# Patient Record
Sex: MALE | URBAN - METROPOLITAN AREA
[De-identification: names, ages, dates, MRNs, and addresses within clinical notes are randomized per-mention and may not be internally consistent; named-entity substitution may affect disease eponyms.]

---

## 2021-09-03 ENCOUNTER — OFFICE VISIT (OUTPATIENT)
Dept: URBAN - METROPOLITAN AREA CLINIC 30 | Facility: CLINIC | Age: 76
End: 2021-09-03
Payer: COMMERCIAL

## 2021-09-03 DIAGNOSIS — H43.812 VITREOUS DEGENERATION, LEFT EYE: ICD-10-CM

## 2021-09-03 DIAGNOSIS — H11.823 CONJUNCTIVOCHALASIS, BILATERAL: Primary | ICD-10-CM

## 2021-09-03 DIAGNOSIS — H43.393 OTHER VITREOUS OPACITIES, BILATERAL: ICD-10-CM

## 2021-09-03 DIAGNOSIS — H25.13 AGE-RELATED NUCLEAR CATARACT, BILATERAL: ICD-10-CM

## 2021-09-03 PROCEDURE — 92133 CPTRZD OPH DX IMG PST SGM ON: CPT | Performed by: OPHTHALMOLOGY

## 2021-09-03 PROCEDURE — 92134 CPTRZ OPH DX IMG PST SGM RTA: CPT | Performed by: OPHTHALMOLOGY

## 2021-09-03 PROCEDURE — 99204 OFFICE O/P NEW MOD 45 MIN: CPT | Performed by: OPHTHALMOLOGY

## 2021-09-03 ASSESSMENT — KERATOMETRY
OD: 44.23
OS: 43.23

## 2021-09-03 ASSESSMENT — VISUAL ACUITY
OD: 20/40
OS: 20/40

## 2021-09-03 ASSESSMENT — INTRAOCULAR PRESSURE
OD: 19
OS: 20

## 2021-09-03 NOTE — IMPRESSION/PLAN
Impression: Age-related nuclear cataract, bilateral: H25.13. Plan: Patient will continue to monitor vision. Patient will return for CEE or if notes any sudden changes in vision or loss of vision. Reevaluate in 3 months. Obtain Glare testing.

## 2021-09-03 NOTE — IMPRESSION/PLAN
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 Plan: Discussed daily artificial tears to improve with dry eye, and informed can purchase eye drops over the counter. Sample of Artificial tears  provided to use three to four times daily OU as needed.

## 2021-09-03 NOTE — IMPRESSION/PLAN
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 Plan: Reviewed with patient . .. Patient will contact the office immediately  if notes any new  vision changes. Recommend Retina consult due to constant flashing.

## 2021-09-30 ENCOUNTER — OFFICE VISIT (OUTPATIENT)
Dept: URBAN - METROPOLITAN AREA CLINIC 30 | Facility: CLINIC | Age: 76
End: 2021-09-30
Payer: COMMERCIAL

## 2021-09-30 DIAGNOSIS — H43.813 VITREOUS DEGENERATION, BILATERAL: Primary | ICD-10-CM

## 2021-09-30 PROCEDURE — 92134 CPTRZ OPH DX IMG PST SGM RTA: CPT | Performed by: OPHTHALMOLOGY

## 2021-09-30 PROCEDURE — 92014 COMPRE OPH EXAM EST PT 1/>: CPT | Performed by: OPHTHALMOLOGY

## 2021-09-30 ASSESSMENT — INTRAOCULAR PRESSURE
OD: 15
OS: 15

## 2021-09-30 NOTE — IMPRESSION/PLAN
Impression: Vitreous degeneration, bilateral: H43.813. Plan: PVD accounts for patients complaints. No retinal holes/tears/detachments on careful examination. RD precautions emphasized.  Retina PRN